# Patient Record
Sex: MALE | Race: WHITE | NOT HISPANIC OR LATINO | Employment: STUDENT | URBAN - METROPOLITAN AREA
[De-identification: names, ages, dates, MRNs, and addresses within clinical notes are randomized per-mention and may not be internally consistent; named-entity substitution may affect disease eponyms.]

---
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
## 2021-04-12 ENCOUNTER — PATIENT OUTREACH (OUTPATIENT)
Dept: ADMINISTRATIVE | Facility: HOSPITAL | Age: 21
End: 2021-04-12

## 2021-06-28 ENCOUNTER — HOSPITAL ENCOUNTER (OUTPATIENT)
Dept: CARDIOLOGY | Facility: HOSPITAL | Age: 21
Discharge: HOME | End: 2021-06-28
Attending: FAMILY MEDICINE
Payer: COMMERCIAL

## 2021-06-28 ENCOUNTER — OFFICE VISIT (OUTPATIENT)
Dept: INTERNAL MEDICINE | Facility: CLINIC | Age: 21
End: 2021-06-28
Payer: COMMERCIAL

## 2021-06-28 ENCOUNTER — IMMUNIZATION (OUTPATIENT)
Dept: INTERNAL MEDICINE | Facility: CLINIC | Age: 21
End: 2021-06-28
Payer: COMMERCIAL

## 2021-06-28 VITALS
DIASTOLIC BLOOD PRESSURE: 80 MMHG | WEIGHT: 194.88 LBS | HEART RATE: 72 BPM | HEIGHT: 66 IN | TEMPERATURE: 98 F | BODY MASS INDEX: 31.32 KG/M2 | SYSTOLIC BLOOD PRESSURE: 122 MMHG

## 2021-06-28 DIAGNOSIS — Z00.00 ROUTINE GENERAL MEDICAL EXAMINATION AT A HEALTH CARE FACILITY: ICD-10-CM

## 2021-06-28 DIAGNOSIS — Z23 NEED FOR VACCINATION: Primary | ICD-10-CM

## 2021-06-28 DIAGNOSIS — F41.1 GAD (GENERALIZED ANXIETY DISORDER): Primary | ICD-10-CM

## 2021-06-28 DIAGNOSIS — Z87.2 HISTORY OF PILONIDAL CYST: ICD-10-CM

## 2021-06-28 PROCEDURE — 1126F PR PAIN SEVERITY QUANTIFIED, NO PAIN PRESENT: ICD-10-PCS | Mod: S$GLB,,, | Performed by: FAMILY MEDICINE

## 2021-06-28 PROCEDURE — 3008F BODY MASS INDEX DOCD: CPT | Mod: CPTII,S$GLB,, | Performed by: FAMILY MEDICINE

## 2021-06-28 PROCEDURE — 99214 OFFICE O/P EST MOD 30 MIN: CPT | Mod: 25,S$GLB,, | Performed by: FAMILY MEDICINE

## 2021-06-28 PROCEDURE — 90472 IMMUNIZATION ADMIN EACH ADD: CPT | Mod: S$GLB,,, | Performed by: FAMILY MEDICINE

## 2021-06-28 PROCEDURE — 90715 TDAP VACCINE 7 YRS/> IM: CPT | Mod: S$GLB,,, | Performed by: FAMILY MEDICINE

## 2021-06-28 PROCEDURE — 90651 9VHPV VACCINE 2/3 DOSE IM: CPT | Mod: S$GLB,,, | Performed by: FAMILY MEDICINE

## 2021-06-28 PROCEDURE — 99395 PR PREVENTIVE VISIT,EST,18-39: ICD-10-PCS | Mod: 25,S$GLB,, | Performed by: FAMILY MEDICINE

## 2021-06-28 PROCEDURE — 91300 COVID-19, MRNA, LNP-S, PF, 30 MCG/0.3 ML DOSE VACCINE: CPT | Mod: PBBFAC | Performed by: FAMILY MEDICINE

## 2021-06-28 PROCEDURE — 90471 TDAP VACCINE GREATER THAN OR EQUAL TO 7YO IM: ICD-10-PCS | Mod: S$GLB,,, | Performed by: FAMILY MEDICINE

## 2021-06-28 PROCEDURE — 90715 TDAP VACCINE GREATER THAN OR EQUAL TO 7YO IM: ICD-10-PCS | Mod: S$GLB,,, | Performed by: FAMILY MEDICINE

## 2021-06-28 PROCEDURE — 99999 PR PBB SHADOW E&M-EST. PATIENT-LVL III: CPT | Mod: PBBFAC,,, | Performed by: FAMILY MEDICINE

## 2021-06-28 PROCEDURE — 93010 ELECTROCARDIOGRAM REPORT: CPT | Mod: ,,, | Performed by: INTERNAL MEDICINE

## 2021-06-28 PROCEDURE — 90472 HPV VACCINE 9-VALENT 3 DOSE IM: ICD-10-PCS | Mod: S$GLB,,, | Performed by: FAMILY MEDICINE

## 2021-06-28 PROCEDURE — 90651 HPV VACCINE 9-VALENT 3 DOSE IM: ICD-10-PCS | Mod: S$GLB,,, | Performed by: FAMILY MEDICINE

## 2021-06-28 PROCEDURE — 90471 IMMUNIZATION ADMIN: CPT | Mod: S$GLB,,, | Performed by: FAMILY MEDICINE

## 2021-06-28 PROCEDURE — 99395 PREV VISIT EST AGE 18-39: CPT | Mod: 25,S$GLB,, | Performed by: FAMILY MEDICINE

## 2021-06-28 PROCEDURE — 3008F PR BODY MASS INDEX (BMI) DOCUMENTED: ICD-10-PCS | Mod: CPTII,S$GLB,, | Performed by: FAMILY MEDICINE

## 2021-06-28 PROCEDURE — 93010 EKG 12-LEAD: ICD-10-PCS | Mod: ,,, | Performed by: INTERNAL MEDICINE

## 2021-06-28 PROCEDURE — 99999 PR PBB SHADOW E&M-EST. PATIENT-LVL III: ICD-10-PCS | Mod: PBBFAC,,, | Performed by: FAMILY MEDICINE

## 2021-06-28 PROCEDURE — 99214 PR OFFICE/OUTPT VISIT, EST, LEVL IV, 30-39 MIN: ICD-10-PCS | Mod: 25,S$GLB,, | Performed by: FAMILY MEDICINE

## 2021-06-28 PROCEDURE — 1126F AMNT PAIN NOTED NONE PRSNT: CPT | Mod: S$GLB,,, | Performed by: FAMILY MEDICINE

## 2021-06-28 PROCEDURE — 93005 ELECTROCARDIOGRAM TRACING: CPT | Mod: PO

## 2021-06-28 RX ORDER — BUPROPION HYDROCHLORIDE 150 MG/1
150 TABLET ORAL DAILY
Qty: 30 TABLET | Refills: 0 | Status: SHIPPED | OUTPATIENT
Start: 2021-06-28 | End: 2021-07-28 | Stop reason: SDUPTHER

## 2021-07-19 ENCOUNTER — IMMUNIZATION (OUTPATIENT)
Dept: INTERNAL MEDICINE | Facility: CLINIC | Age: 21
End: 2021-07-19
Payer: COMMERCIAL

## 2021-07-19 DIAGNOSIS — Z23 NEED FOR VACCINATION: Primary | ICD-10-CM

## 2021-07-19 PROCEDURE — 91300 COVID-19, MRNA, LNP-S, PF, 30 MCG/0.3 ML DOSE VACCINE: CPT | Mod: PBBFAC | Performed by: FAMILY MEDICINE

## 2021-07-19 PROCEDURE — 0002A COVID-19, MRNA, LNP-S, PF, 30 MCG/0.3 ML DOSE VACCINE: CPT | Mod: PBBFAC | Performed by: FAMILY MEDICINE

## 2021-08-10 ENCOUNTER — OFFICE VISIT (OUTPATIENT)
Dept: INTERNAL MEDICINE | Facility: CLINIC | Age: 21
End: 2021-08-10
Payer: COMMERCIAL

## 2021-08-10 DIAGNOSIS — F41.1 GAD (GENERALIZED ANXIETY DISORDER): Primary | ICD-10-CM

## 2021-08-10 DIAGNOSIS — Z28.9 DELAYED IMMUNIZATIONS: ICD-10-CM

## 2021-08-10 PROCEDURE — 1159F PR MEDICATION LIST DOCUMENTED IN MEDICAL RECORD: ICD-10-PCS | Mod: CPTII,,, | Performed by: FAMILY MEDICINE

## 2021-08-10 PROCEDURE — 1160F PR REVIEW ALL MEDS BY PRESCRIBER/CLIN PHARMACIST DOCUMENTED: ICD-10-PCS | Mod: CPTII,,, | Performed by: FAMILY MEDICINE

## 2021-08-10 PROCEDURE — 3044F HG A1C LEVEL LT 7.0%: CPT | Mod: CPTII,,, | Performed by: FAMILY MEDICINE

## 2021-08-10 PROCEDURE — 1160F RVW MEDS BY RX/DR IN RCRD: CPT | Mod: CPTII,,, | Performed by: FAMILY MEDICINE

## 2021-08-10 PROCEDURE — 99214 OFFICE O/P EST MOD 30 MIN: CPT | Mod: 95,,, | Performed by: FAMILY MEDICINE

## 2021-08-10 PROCEDURE — 1159F MED LIST DOCD IN RCRD: CPT | Mod: CPTII,,, | Performed by: FAMILY MEDICINE

## 2021-08-10 PROCEDURE — 3044F PR MOST RECENT HEMOGLOBIN A1C LEVEL <7.0%: ICD-10-PCS | Mod: CPTII,,, | Performed by: FAMILY MEDICINE

## 2021-08-10 PROCEDURE — 99214 PR OFFICE/OUTPT VISIT, EST, LEVL IV, 30-39 MIN: ICD-10-PCS | Mod: 95,,, | Performed by: FAMILY MEDICINE

## 2021-08-10 RX ORDER — BUPROPION HYDROCHLORIDE 75 MG/1
75 TABLET ORAL DAILY
Qty: 90 TABLET | Refills: 1 | Status: SHIPPED | OUTPATIENT
Start: 2021-08-10 | End: 2022-02-04

## 2021-08-10 RX ORDER — BUPROPION HYDROCHLORIDE 150 MG/1
150 TABLET ORAL DAILY
Qty: 90 TABLET | Refills: 1 | Status: SHIPPED | OUTPATIENT
Start: 2021-08-10 | End: 2021-09-07

## 2022-02-04 DIAGNOSIS — F41.1 GAD (GENERALIZED ANXIETY DISORDER): ICD-10-CM

## 2022-02-04 RX ORDER — BUPROPION HYDROCHLORIDE 75 MG/1
TABLET ORAL
Qty: 30 TABLET | Refills: 0 | Status: SHIPPED | OUTPATIENT
Start: 2022-02-04

## 2022-12-28 ENCOUNTER — PATIENT OUTREACH (OUTPATIENT)
Dept: ADMINISTRATIVE | Facility: HOSPITAL | Age: 22
End: 2022-12-28
Payer: COMMERCIAL